# Patient Record
(demographics unavailable — no encounter records)

---

## 2025-04-29 NOTE — REASON FOR VISIT
[FreeTextEntry1] : 71 year-old male with BPH presents for followup.    Patient was last seen on 6/9/23 -> Patient reports feeling dizzy for the past month with BP of 115-153. Patient reports SOB on stairs. Patient denies CP. Patient denies palpitations. Patient denies h/o syncope. Patient is on Tamsulosin 0.4 mg and flomax. His BP is 110-115-105. I advised patient to undergo an echocardiogram.  Patient underwent an echocardiogram and it showed normal LV function without significant valvular pathology.   Patient underwent an echocardiogram 6/6/18 and it showed normal LV function without significant valvular pathology.    Patient underwent a treadmill stress test 6/6/18 and completed 7 minutes of Chivo protocol.  There was no ECG evidence of ischemia.  Patient snores.

## 2025-04-29 NOTE — PHYSICAL EXAM
[General Appearance - Well Developed] : well developed [Normal Appearance] : normal appearance [Well Groomed] : well groomed [General Appearance - Well Nourished] : well nourished [No Deformities] : no deformities [General Appearance - In No Acute Distress] : no acute distress [Normal Conjunctiva] : the conjunctiva exhibited no abnormalities [Eyelids - No Xanthelasma] : the eyelids demonstrated no xanthelasmas [Normal Oral Mucosa] : normal oral mucosa [No Oral Pallor] : no oral pallor [No Oral Cyanosis] : no oral cyanosis [Normal Jugular Venous A Waves Present] : normal jugular venous A waves present [Normal Jugular Venous V Waves Present] : normal jugular venous V waves present [No Jugular Venous Davila A Waves] : no jugular venous davila A waves [Respiration, Rhythm And Depth] : normal respiratory rhythm and effort [Exaggerated Use Of Accessory Muscles For Inspiration] : no accessory muscle use [Auscultation Breath Sounds / Voice Sounds] : lungs were clear to auscultation bilaterally [Heart Rate And Rhythm] : heart rate and rhythm were normal [Heart Sounds] : normal S1 and S2 [Murmurs] : no murmurs present [Arterial Pulses Normal] : the arterial pulses were normal [Abdomen Soft] : soft [Abdomen Tenderness] : non-tender [Abdomen Mass (___ Cm)] : no abdominal mass palpated [Abnormal Walk] : normal gait [Gait - Sufficient For Exercise Testing] : the gait was sufficient for exercise testing [Nail Clubbing] : no clubbing of the fingernails [Cyanosis, Localized] : no localized cyanosis [Petechial Hemorrhages (___cm)] : no petechial hemorrhages [] : no ischemic changes [Oriented To Time, Place, And Person] : oriented to person, place, and time [Affect] : the affect was normal [Mood] : the mood was normal [No Anxiety] : not feeling anxious [FreeTextEntry1] : Trace edema noted.

## 2025-04-29 NOTE — HISTORY OF PRESENT ILLNESS
[FreeTextEntry1] : 4/22/25- Patient reports feet and head numbness. Patient denies CP. Patient reports SOB with exertion. Patient denies palpitations. Patient denies h/o syncope. Patient has postural lightheadedness.  I advised patient to undergo an echocardiogram and a treadmill stress test.   6/9/23 - Patient reports feeling dizzy for the past month with  BP of 115-153. Patient reports SOB on stairs. Patient denies CP. Patient denies palpitations. Patient denies h/o syncope. Patient is on Tamsulosin 0.4 mg and flomax. His BP is 110-115-105. I advised patient to undergo an echocardiogram.   6/6/18 - Patient reports stable left-sided CP.  Patient reports KRAUS.  Patient denies palpitations.  Patient underwent an echocardiogram and it showed normal LV function without significant valvular pathology.  Patient underwent a treadmill stress test and completed 7 minutes of Chivo protocol.  There was no ECG evidence of ischemia.  Patient snores.  Patient reports HA.  I advised patient to consider sleep evaluation.    9/7/17 - Patient reports that he continues to experience left-sided CP, not related to exertion, lasting 30 minutes. Patient reports that his lightheadedness is better after decreasing Hytrin from 5 mg to 2 mg.   6/7/17 - 63-year-old male with BPH presents for evaluation of chest pain. Patient reports that for the past few months he has been experiencing episodes of left-sided chest discomfort, described as sharp pain, not related to exertion, lasting seconds. Patient reports SOB with exertion. Patient reports occasional palpitations, described as fast heartbeats. He takes Atenolol when his HR is elevated. Patient denies history of syncope.

## 2025-04-29 NOTE — PHYSICAL EXAM
[General Appearance - Well Developed] : well developed [Normal Appearance] : normal appearance [Well Groomed] : well groomed [General Appearance - Well Nourished] : well nourished [No Deformities] : no deformities [General Appearance - In No Acute Distress] : no acute distress [Normal Conjunctiva] : the conjunctiva exhibited no abnormalities [Eyelids - No Xanthelasma] : the eyelids demonstrated no xanthelasmas [Normal Oral Mucosa] : normal oral mucosa [No Oral Pallor] : no oral pallor [No Oral Cyanosis] : no oral cyanosis [Normal Jugular Venous A Waves Present] : normal jugular venous A waves present [Normal Jugular Venous V Waves Present] : normal jugular venous V waves present [No Jugular Venous Davila A Waves] : no jugular venous davila A waves [Respiration, Rhythm And Depth] : normal respiratory rhythm and effort [Auscultation Breath Sounds / Voice Sounds] : lungs were clear to auscultation bilaterally [Exaggerated Use Of Accessory Muscles For Inspiration] : no accessory muscle use [Heart Rate And Rhythm] : heart rate and rhythm were normal [Heart Sounds] : normal S1 and S2 [Murmurs] : no murmurs present [Arterial Pulses Normal] : the arterial pulses were normal [Abdomen Soft] : soft [Abdomen Tenderness] : non-tender [Abdomen Mass (___ Cm)] : no abdominal mass palpated [Abnormal Walk] : normal gait [Gait - Sufficient For Exercise Testing] : the gait was sufficient for exercise testing [Nail Clubbing] : no clubbing of the fingernails [Cyanosis, Localized] : no localized cyanosis [Petechial Hemorrhages (___cm)] : no petechial hemorrhages [] : no ischemic changes [Oriented To Time, Place, And Person] : oriented to person, place, and time [Affect] : the affect was normal [Mood] : the mood was normal [No Anxiety] : not feeling anxious [FreeTextEntry1] : Trace edema noted.